# Patient Record
Sex: MALE | Race: WHITE | Employment: FULL TIME | URBAN - METROPOLITAN AREA
[De-identification: names, ages, dates, MRNs, and addresses within clinical notes are randomized per-mention and may not be internally consistent; named-entity substitution may affect disease eponyms.]

---
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
## 2020-06-17 ENCOUNTER — TELEPHONE (OUTPATIENT)
Dept: BEHAVIORAL HEALTH | Facility: CLINIC | Age: 20
End: 2020-06-17

## 2020-06-23 ENCOUNTER — HOSPITAL ENCOUNTER (OUTPATIENT)
Dept: BEHAVIORAL HEALTH | Facility: CLINIC | Age: 20
Discharge: HOME | End: 2020-06-23
Attending: FAMILY MEDICINE | Admitting: FAMILY MEDICINE
Payer: COMMERCIAL

## 2020-06-23 PROCEDURE — 90791 PSYCH DIAGNOSTIC EVALUATION: CPT | Mod: TEL

## 2020-06-23 NOTE — PROGRESS NOTES
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 D    What percentage of time do you spaulding alone?  With others?     PT reports gambling alone all of the time.    Are you currently in a significant relationship?     Yes.  PT reports he is currently engaged.    Sexual Orientation:     Heterosexual    Who do you live with?      PT reports living with his fikayla and her parents.    Do you have any children?      PT reports having a child with his ex-girlfriend, report the baby was put up for adoption, reporting an open adoption.  PT reports the child will be 1 in August.    How many times have you been ?    No    Describe your current support system i.e. family, friends, sponsor, therapist, etc.?    PT reports having support from his mother and fiance.  PT also reports support from current medical providers.    Do you have any past or present legal charges?    No    Do you have any obstacles that would prevent you from participating in treatment?    No    Do you pray, meditate, do yoga, attend AA/NA/GA or other spiritual practices?    Yes, please explain: pray     How does your spirituality impact your recovery?      Per PT \"It will help a lot\".    Please list any other problems, issues or concerns that could affect your recovery if not addressed?      Per PT \"None I " "can think of right now\".    Dimension VI Ratings   Recovery environment - The placing authority must use the criteria in Dimension VI to determine a client s recovery environment.   RISK DESCRIPTIONS - Severity ratin Client has passive social  or family and significant other are not interested in the client's recovery. The client is engaged in structured meaningful activity.    REASONS SEVERITY WAS ASSIGNED - (What support does the person have for making changes? What structure/stability does the person have in his or her daily life that will increase the likelihood that changes can be sustained? What problems exist in the person s environment that will jeopardize getting/staying clean and sober?)     The patient reports living with his fiance and her parents at the time of assessment.  Patient reports living environment is supportive of recovery efforts.  Patient reports he is currently engaged to be .  The patient does reports having relationship conflict with family due to continued gambling.  Patient appears to have limited external activities, leisure time, friends, or social groups outside of gambling.  The patient appears to lack adequate support in the community through 12-step meetings or other recovery based interactions at this time and appears to lack additional supports familiar with recovery.  Patient reports he is currently employed full time and appears to lack meaningful activities outside of employment.  Patient denies any current legal involvement at this time.         Collateral Contacts     Name:       Relationship:     Phone Number:     Releases:         No collateral contacts made.              Summary of Gambling Disorder Symptoms     Needs to spaulding with increasing amount of money in order to achieve desired excitement.  Is restlessness or irritable when attempting to cut down or stop gambling.  Is often preoccupied with gambling (e.g. having persistent thoughts of " "reliving past gambling experiences, handicapping or planning the next venture, thinking of ways to get money with which to spaulding).  After losing money gambling, individual often returned another day to get even. (\"chasing one's losses\")  Lies to conceal the extent of involvement with gambling.  Relies on others to provide money to relieve desperate financial situations caused by gambling (\"a bailout\")    Specify if:   Episodic:  Meeting diagnostic at more than one time point, with symptoms subsiding between periods of gambling disorder for at least several months.    Persistent:  Experiencing continuous symptoms, to meet diagnostic criteria for multiple years.    Specify if:   In early remission:  After full criteria for alcohol/drug use disorder were previously met, none of the criteria for alcohol/drug use disorder have been met for at least 3 months but for less than 12 months (with the exception that Criterion A4,  Craving or a strong desire or urge to use alcohol/drug  may be met).     In sustained remission:   After full criteria for alcohol use disorder were previously met, none of the criteria for alcohol/drug use disorder have been met at any time during a period of 12 months or longer (with the exception that Criterion A4,  Craving or strong desire or urge to use alcohol/drug  may be met).   Specify if:   This additional specifier is used if the individual is in an environment where access to alcohol is restricted.    Mild: Presence of 4-5 symptoms    Moderate: Presence of 6-7 symptoms    Severe: Presence of 8 or more symptoms      Summary of Substance Abuse Disorder Symptoms     A problematic pattern of alcohol/drug use leading to clinically significant impairment or distress, as manifested by at least two of the following, occurring within a 12-month period:    Alcohol/drug is often taken in larger amounts or over a longer period than was intended.  Withdrawal, as manifested by either of the following: " The characteristic withdrawal syndrome for alcohol/drug (refer to Criteria A and B of the criteria set for alcohol/drug withdrawal).    Specify if:   In early remission:  After full criteria for alcohol/drug use disorder were previously met, none of the criteria for alcohol/drug use disorder have been met for at least 3 months but for less than 12 months (with the exception that Criterion A4,  Craving or a strong desire or urge to use alcohol/drug  may be met).     In sustained remission:   After full criteria for alcohol use disorder were previously met, none of the criteria for alcohol/drug use disorder have been met at any time during a period of 12 months or longer (with the exception that Criterion A4,  Craving or strong desire or urge to use alcohol/drug  may be met).   Specify if:   This additional specifier is used if the individual is in an environment where access to alcohol is restricted.    Mild: Presence of 2-3 symptoms    Moderate: Presence of 4-5 symptoms    Severe: Presence of 6 or more symptoms    SOGS: 13 DSM-5: 7 CAGE-AID: 1 NEEL-7: 14 PHQ-9: 13     Mental Status Assessment    Physical Appearance/Attire:  Comment: Unable to assess due to telephone evaluation.  Hygiene:  Comment: Unable to assess due to telephone evaluation  Eye Contact:  comment: Unable to assess due to telephone evaluation.  Speech:  regular  Speech Volume:  regular  Speech Quality: fluid.  Cognitive/Perceptual:  reality based  Cognition:  memory intact   Judgment:  intact  Insight:  intact  Orientation:  time, place, person and situation  Thought:  logical   Hallucinations:  none  General Behavioral Tone:  cooperative  Psychomotor Activity:  Unable to assess due to telephone evaluation.  Gait:  Unable to assess due to telephone evaluation.  Mood:  normal  Affect:  congruence/appropriate      Vulnerable Adult Checklist for OUTPATIENTS     1.  Do you have a physical, emotional or mental infirmity or dysfunction?       No    2.   Does this issue impair your ability to provide for your own care without help, including providing yourself with food, shelter, clothing, healthcare or supervision?       No    3.  Because of this issue, I need assistance to protect myself from maltreatment by others.      No    Based on the above information:    This person is not a functional Vulnerable Adult according to [place] Statute 626.5572 subdivision 21.      Category Severity (ICD-10 Code / DSM 5 Code)   Gambling Disorder Severe  (F63.0) (312.31)   Alcohol Use Disorder Mild  (F10.10) (305.00)   Cannabis Use Disorder Mild  (F12.10) (305.20)   Hallucinogen Use Disorder NA   Inhalant Use Disorder NA   Opioid Use Disorder NA   Sedative, Hypnotic, or Anxiolytic Use Disorder NA   Stimulant Related Disorder NA   Tobacco Use Disorder NA   Other (or unknown) Substance Use Disorder NA     [place]-Suicide Severity Rating Scale   Suicide Ideation   1.) Have you ever wished you were dead or that you could go to sleep and not wake up?     Lifetime:  Yes Past Month:  Yes   2.) Have you actually had any thoughts of killing yourself?   Lifetime:  Yes Past Month:  No   3.) Have you been thinking about how you might do this?     Lifetime:  Yes, Describe: PT reports ideation with plan in Jr. Year of HS, reporting hospilzation followign. Past Month:  No   4.) Have you had these thoughts and had some intention of acting on them?     Lifetime:  No Past Month:  No   5.) Have you started to work out the details of how to kill yourself?   Lifetime:  No Past Month:  No   6.) Do you intend to carry out this plan?      Lifetime:  No Past Month:  No   Intensity of Ideation   Intensity of ideation (1 being least severe, 5 being most severe):     Lifetime:  4.5 Past Month:  1   How often do you have these thoughts?  Once a week      When you have the thoughts how long do they last?  Fleeting - few seconds or minutes   Can you stop thinking about killing yourself or wanting to die if  you want to?  Yes, easily able to control thoughts   Are there things - anyone or anything (i.e. family, Zoroastrian, pain of death) that stopped you from wanting to die or acting on thoughts of suicide?  Protective factors definitely stopped you from attempting suicide   What sort of reasons did you have for thinking about wanting to die or killing yourself (ie end pain, stop how you were feeling, get attention or reaction, revenge)?  Mostly to end or stop the pain (you couldn't go on living the way you were feeling)   Suicidal Behavior   (Suicide Attempt) - Have you made a suicide attempt?     Lifetime:  No Past Month:  No   Have you engaged in self-harm (non-suicidal self-injury)?  Yes - Jr. Year, PT reports knee pain, reporting watching Dr. Lawrence and illited pain elwear on body to elivate knee pain.   (Interrupted Attempt) - Has there been a time when you started to do something to end your life but someone or something stopped you before you actually did anything?  No   (Aborted or Self-Interrupted Attempt) - Has there been a time when you started to do something to try to end your life but you stopped yourself before you actually did anything?  No   (Preparatory Acts of Behavior) - Have you taken any steps towards making suicide attempt or preparing to kill yourself (such as collecting pills, getting a gun, giving valuables away or writing a suicide note)?  No   Actual Lethality/Medical Damage:  NA     2008  [employer]  Used with permission by Maritza Leary, PhD.       Guide to C-SSRS Risk Ratings   NO IDEATION:  with no active thoughts IDEATION: with a wish to die. IDEATION: with active thoughts. Risk Ratings   If Yes No No 0 - Very Low Risk   If NA Yes No 1 - Low Risk   If NA Yes Yes 2 - Low/moderate risk   IDEATION: associated thoughts of methods without intent or plan INTENT: Intent to follow through on suicide PLAN: Plan to follow through on suicide Risk Ratings cont...  "  If Yes No No 3 - Moderate Risk   If Yes Yes No 4 - High Risk   If Yes Yes Yes 5 - High Risk   The patient's ADDITIONAL RISK FACTORS and lack of PROTECTIVE FACTORS may increase their overall suicide risk ratings.     Additional Risk Factors:    Significant history of having untreated or poorly treated mental health symptoms     Significant history of untreated or poorly treated chronic pain issues     A triggering event(s) leading to humiliation, shame or despair     History of impulsive or aggressive behaviors   Protective Factors:    Having people in his/her life that would prevent the patient from considering a suicide attempt (i.e. young children, spouse, parents, etc.)     An absence of mental health issues or stable and well treated mental health issues     An absence of chronic health problems or stable and well treated chronic health issues     A positive relationship with his/her clinical medical and/or mental health providers     Having easy access to supportive family members     Having a good community support network     Having cultural, Amish or spiritual beliefs that discourage suicide     Having restricted access to highly lethal means of suicide     Risk Status   0. - Very Low Risk:  Evaluation Counselors:  Document in Epic / SBAR to counselor \"Very Low Risk\".      Treatment Counselors:  Reassess upon admission as applicable, assess weekly in progress notes under Dimension 3 and summarize in Discharge / Treatment summary under Dimension 3.   Additional information to support suicide risk rating: There was no additional information to provide at this time.     Summary of [facility] Placement Criteria:   I.) Intoxication and Withdrawal: 0   II.) Biomedical:  1   III.) Emotional and Behavioral:  2   IV.) Readiness to Change:  1   V.) Relapse Potential: 3   VI.) Recovery Environmental: 2     Evaluation Summary and Plan   's Recommendation    Assessors Recommendations:      Abstain from all forms " "of gambling, including \"free\" games and online/homer games.    Refrain from entering all types of madhavi establishments.    Self-ban with the help of a trusted other from all local casinos/card rooms, cut up players cards and have all gambling mail stopped.    Abstain from alcohol and all mood-altering substances unless prescribed by a licensed medical provider.    Gain a [facility] certified problem gambling treatment provider for individual counseling sessions.    Keep and attend all scheduled mental health appointments and follow recommendations.    Attend Gamblers Anonymous (GA) 12-step, cultural, spiritual, and/or other supportive community meetings on a weekly basis.    Check in weekly with a trusted individual whom will hold you accountable.    PT was given information regarding [facility] certified treatment providers and [employer] (GA) link, in addition to financial resources.     Initial problem list:    The patient lacks relapse prevention skills  The patient has poor coping skills  The patient has poor refusal skills   The patient lacks a sober peer support network  The patient has dual issues of MI and CD  The patient lacks the ability to effectively manage his/her mental health issues    Strengths:  Family support  Stable Employment  Financially secure  Properly treated mental health concerns  Properly treated physical health concerns  Stable housing  Intelligent  Functional communication skills        "

## 2020-06-26 NOTE — PROGRESS NOTES
"Good morning,    I m sending this Email regarding the recommendations from your gambling evaluation on 6/23/2020.  Based on the information you provided, you meet criteria for gambling disorder.    Recommendations:     1. Abstain from all forms of gambling, including \"free\" games , and online/homer games.  2. Refrain from entering all types of madhavi establishments.  3. Abstain from alcohol and all other mood-altering chemicals unless prescribed by a licensed provider.  4. Attend GA 12-step, cultural, spiritual, other supportive community meeting on a weekly basis.   5. Have someone you check in with weekly who will hold you accountable.   6. Remain law abiding.  7. Keep and attend all scheduled mental health appointments and follow recommendations.  8. Gain a certified gambling treatment provider for individual counseling sessions and follow ongoing recommendations.    Below are links to [facility] certified treatment providers whom provide individual counseling sessions.  In addition, I have included links for additional resources, including financial, and online Gamblers Anonymous meetings.    https://www.[facility].org/Problem-Gambling.asp     https://[place].gov/dhs/people-we-serve/adults/services/gambling-problems/programs-services/treatment-providers-gambling.jsp      Financial links:    https://www.[employer].Miner/    https://www.[employer]meRanberry.org/       Gamblers Anonymous (GA)    http://www.[employer].Miner/    [facility] [place] may currently be offering a one year subscription to [employer] to any [facility]. It is a service that blocks online gambling. Please contact Dulce Srinivasan at [facility]ambling [place] if you are interested. [email or web address]     Thank you.    Carolyn Mosley, [facility], [facility]  Problem Gambling Counselor  [address] [place], [place] " [address]  [phone]  [email or web address]

## 2020-07-06 ENCOUNTER — BEH TREATMENT PLAN (OUTPATIENT)
Dept: BEHAVIORAL HEALTH | Facility: CLINIC | Age: 20
End: 2020-07-06
Attending: PSYCHIATRY & NEUROLOGY

## 2020-07-06 ENCOUNTER — HOSPITAL ENCOUNTER (OUTPATIENT)
Dept: BEHAVIORAL HEALTH | Facility: CLINIC | Age: 20
Discharge: HOME | End: 2020-07-06
Attending: FAMILY MEDICINE | Admitting: FAMILY MEDICINE
Payer: COMMERCIAL

## 2020-07-06 DIAGNOSIS — F33.1 MDD (MAJOR DEPRESSIVE DISORDER), RECURRENT EPISODE, MODERATE (H): ICD-10-CM

## 2020-07-06 PROCEDURE — 90791 PSYCH DIAGNOSTIC EVALUATION: CPT | Mod: GT | Performed by: SOCIAL WORKER

## 2020-07-06 RX ORDER — LAMOTRIGINE 100 MG/1
100 TABLET ORAL DAILY
COMMUNITY

## 2020-07-06 ASSESSMENT — ANXIETY QUESTIONNAIRES
7. FEELING AFRAID AS IF SOMETHING AWFUL MIGHT HAPPEN: NOT AT ALL
3. WORRYING TOO MUCH ABOUT DIFFERENT THINGS: SEVERAL DAYS
6. BECOMING EASILY ANNOYED OR IRRITABLE: MORE THAN HALF THE DAYS
IF YOU CHECKED OFF ANY PROBLEMS ON THIS QUESTIONNAIRE, HOW DIFFICULT HAVE THESE PROBLEMS MADE IT FOR YOU TO DO YOUR WORK, TAKE CARE OF THINGS AT HOME, OR GET ALONG WITH OTHER PEOPLE: SOMEWHAT DIFFICULT
5. BEING SO RESTLESS THAT IT IS HARD TO SIT STILL: MORE THAN HALF THE DAYS
1. FEELING NERVOUS, ANXIOUS, OR ON EDGE: MORE THAN HALF THE DAYS
2. NOT BEING ABLE TO STOP OR CONTROL WORRYING: SEVERAL DAYS
GAD7 TOTAL SCORE: 10

## 2020-07-06 ASSESSMENT — PATIENT HEALTH QUESTIONNAIRE - PHQ9
SUM OF ALL RESPONSES TO PHQ QUESTIONS 1-9: 15
5. POOR APPETITE OR OVEREATING: MORE THAN HALF THE DAYS
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
## 2020-07-07 ENCOUNTER — TELEPHONE (OUTPATIENT)
Dept: BEHAVIORAL HEALTH | Facility: CLINIC | Age: 20
End: 2020-07-07

## 2020-07-07 ENCOUNTER — HOSPITAL ENCOUNTER (OUTPATIENT)
Dept: BEHAVIORAL HEALTH | Facility: CLINIC | Age: 20
End: 2020-07-07
Attending: PSYCHIATRY & NEUROLOGY
Payer: COMMERCIAL

## 2020-07-07 PROBLEM — F33.1 MDD (MAJOR DEPRESSIVE DISORDER), RECURRENT EPISODE, MODERATE (H): Status: ACTIVE | Noted: 2020-07-07

## 2020-07-07 PROCEDURE — 90853 GROUP PSYCHOTHERAPY: CPT | Mod: GT,95 | Performed by: COUNSELOR

## 2020-07-07 ASSESSMENT — ANXIETY QUESTIONNAIRES
7. FEELING AFRAID AS IF SOMETHING AWFUL MIGHT HAPPEN: SEVERAL DAYS
2. NOT BEING ABLE TO STOP OR CONTROL WORRYING: MORE THAN HALF THE DAYS
3. WORRYING TOO MUCH ABOUT DIFFERENT THINGS: SEVERAL DAYS
1. FEELING NERVOUS, ANXIOUS, OR ON EDGE: MORE THAN HALF THE DAYS
GAD7 TOTAL SCORE: 13
IF YOU CHECKED OFF ANY PROBLEMS ON THIS QUESTIONNAIRE, HOW DIFFICULT HAVE THESE PROBLEMS MADE IT FOR YOU TO DO YOUR WORK, TAKE CARE OF THINGS AT HOME, OR GET ALONG WITH OTHER PEOPLE: SOMEWHAT DIFFICULT
GAD7 TOTAL SCORE: 10
6. BECOMING EASILY ANNOYED OR IRRITABLE: MORE THAN HALF THE DAYS
5. BEING SO RESTLESS THAT IT IS HARD TO SIT STILL: NEARLY EVERY DAY

## 2020-07-07 ASSESSMENT — PATIENT HEALTH QUESTIONNAIRE - PHQ9
SUM OF ALL RESPONSES TO PHQ QUESTIONS 1-9: 16
5. POOR APPETITE OR OVEREATING: MORE THAN HALF THE DAYS

## 2020-07-07 NOTE — PROGRESS NOTES
"RN Review of Medical History / Physical Health Screen  Outpatient Mental Health Programs - Adult    Adult Dual Diagnosis Day Treatment    PATIENT'S NAME: Derik Rashid  MRN:   [id number]  :   2000  ACCT. NUMBER: [id number]  CURRENT AGE:  19 year old    DATE OF DIAGNOSTIC ASSESSMENT: 20  DATE OF ADMISSION: 20     Please see Diagnostic Assessment for additional Medical History.     General Health:   Have you had any exposure to any communicable disease in the past 2-3 weeks? no     Are you aware of safe sex practices? yes       Nutrition:    Are you on a special diet? If yes, please explain:  no   Do you have any concerns regarding your nutritional status? If yes, please explain:  no   Have you had any appetite changes in the last 3 months?  No     Have you had any weight loss or weight gain in the last 3 months?  No     Do you have a history of an eating disorder? no    Do you have a history of being in an eating disorder program? no     Patient height and weight recorded by RN in epic flowsheet: noNo; Unable to measure  Because of temporary in-person programmatic suspension due to COVID-19 pandemic, all pt weights and heights will be collected through patient self-report an recorded in physical health screening progress note upon admission to the program.                            Height/Weight Review:  Patient reported height:     6'13/4\"   Patient reports weight:  Date last checked:  170lb   Any referrals/needs identified?     no         BMI Review:  Was the patient informed of BMI? no      Findings  No Intervention         Fall Risk:   Have you had any falls in the past 3 months? no     Do you currently use any assistive devices for mobility?   no      Additional Comments/Assessment: no    Per completion of the Medical History / Physical Health Screen, is there a recommendation to see / follow up with a primary care physician/clinic or dentist?    No. pts sees PMD for concerns.      Jamilah " JAN Cristobal  7/7/2020

## 2020-07-07 NOTE — PROGRESS NOTES
Admission Date: 7/7/2020    Identify any current concerns with potential impact to admission:     medication/medical concerns: None reported     immediate safety concerns: None reported    Does patient have safety plan? No  Note: Please copy safety plan copied into BEH Encounter     Other (insurance/childcare/transportation/housing/planned absences/etc): None reported    Patient's insurance is: [employer] . Does patient need appointment with provider? No    If patient has Medical Assistance (MA) is LOCUS and Functional Assessment completed? N/A    If patient is in Partial Hospitalization Program is LOCUS completed? N/A                                                                                      (delete if not applicable):   For Dual Disorder Outpatient Program:     Patient reported his last use of substances as: 6/29/20 (alcohol and vyvanse)     Patient reports the following concerns in regards to withdrawal/intoxication: None reported        Completed by: Leonor Marroquin [facility]

## 2020-07-07 NOTE — GROUP NOTE
Psychotherapy Group Note    PATIENT'S NAME: Derik Rashid  MRN:   [id number]  :   2000  ACCT. NUMBER: [id number]  DATE OF SERVICE: 20  START TIME: 10:00 AM  END TIME: 10:50 AM  FACILITATOR: Leonor Marroquin [facility]  TOPIC: MH EBP Group: Self-Awareness  Adult Dual Diagnosis Day Treatment  TRACK: 1    NUMBER OF PARTICIPANTS: 7    Telemedicine Visit: The patient's condition can be safely assessed and treated via synchronous audio and visual telemedicine encounter.      Reason for Telemedicine Visit: Services only offered telehealth    Originating Site (Patient Location): Patient's home    Distant Site (Provider Location): Provider Remote Setting    Consent:  The patient/guardian has verbally consented to: the potential risks and benefits of telemedicine (video visit) versus in person care; bill my insurance or make self-payment for services provided; and responsibility for payment of non-covered services.     Mode of Communication:  Video Conference via [employer]    As the provider I attest to compliance with applicable laws and regulations related to telemedicine.      Summary of Group / Topics Discussed:  Self-Awareness: Self-Compassion: Patients received overview of key concepts in developing self-compassion. Patients discussed mindfulness, self-kindness, and finding common humanity. Patients identified their current approach to problems in their lives and learned skills for increasing self-compassion. Patients identified ways they can put self-compassion skills into practice and problem solve barriers to application of skills.     Patient Session Goals / Objectives:    [place] components of self-compassion    Identify ways to practice self-compassion in daily life    Problem solve barriers to self-compassion practice      Patient Participation / Response:  Fully participated with the group by sharing personal reflections / insights and openly received / provided feedback with other  participants.    Demonstrated understanding of topics discussed through group discussion and participation, Demonstrated understanding of values, strengths, and challenges to learn about themselves, Identified / Expressed readiness to act intentionally, increase self-compassion, promote personal growth and Verbalized understanding of ways to proactively manage illness    Treatment Plan:  Patient has an initial individualized treatment plan that was created as part of their diagnostic assessment / admission process.  A master individualized treatment plan is in the process of being developed with the patient and multi-disciplinary care team.    Leonor Marroquin, [facility]

## 2020-07-07 NOTE — GROUP NOTE
"Process Group Note    PATIENT'S NAME: Derik Rashid  MRN:   [id number]  :   2000  ACCT. NUMBER: [id number]  DATE OF SERVICE: 20  START TIME:  9:00 AM  END TIME:  9:50 AM  FACILITATOR: Leonor Marroquin [facility]  TOPIC:  Process Group    Diagnoses:  296.32 (F33.1) Major Depressive Disorder, Recurrent Episode, Moderate _ and With anxious distress  Substance-Related & Addictive Disorders Alcohol Use Disorder   303.90 (F10.20) Moderate In early remission,   304.30 (F12.20) Cannabis Use Disorder Moderate  In early remission, .  4. Other Diagnoses that is relevant to services:   Autism Spectrum Disorder 299.00(F84.0)  Associated with another neurodevelopmental, mental or behavioral disorder.      Adult Dual Diagnosis Day Treatment  TRACK: 1    NUMBER OF PARTICIPANTS: 6    Telemedicine Visit: The patient's condition can be safely assessed and treated via synchronous audio and visual telemedicine encounter.      Reason for Telemedicine Visit: Services only offered telehealth    Originating Site (Patient Location): Patient's home    Distant Site (Provider Location): Provider Remote Setting    Consent:  The patient/guardian has verbally consented to: the potential risks and benefits of telemedicine (video visit) versus in person care; bill my insurance or make self-payment for services provided; and responsibility for payment of non-covered services.     Mode of Communication:  Video Conference via [employer]    As the provider I attest to compliance with applicable laws and regulations related to telemedicine.            Data:    Session content: At the start of this group, patients were invited to check in by identifying themselves, describing their current emotional status, and identifying issues to address in this group.   Area(s) of treatment focus addressed in this session included Symptom Management, Personal Safety and Abstinence/Relapse Prevention.    Bertram reported feeling \"pretty good\" today.  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  He took some time to introduce himself to the group and share what brought him to treatment.  He reported that his substance use was causing lot of problems in his personal life (with fiance and parents) and knew he needed to do something about it.  He reported his is currently living at home with his parents but him and his fiance plan to move to [place] in the future.     Therapeutic Interventions/Treatment Strategies:  Psychotherapist offered support, feedback and validation and reinforced use of skills. Treatment modalities used include Motivational Interviewing, Cognitive Behavioral Therapy and Dialectical Behavioral Therapy. Interventions include Relapse Prevention: Discussed the use of substances and its impact on their relationships.    Assessment:    Patient response:   Patient responded to session by accepting feedback, giving feedback and listening    Possible barriers to participation / learning include: and no barriers identified    Health Issues:   None reported       Substance Use Review:   Substance Use: alcohol .  and Last use: 6/28/20    Mental Status/Behavioral Observations  Appearance:   Appropriate   Eye Contact:   Good   Psychomotor Behavior: Normal   Attitude:   Cooperative   Orientation:   All  Speech   Rate / Production: Normal    Volume:  Normal   Mood:    Normal  Affect:    Appropriate   Thought Content:   Clear  Thought Form:  Coherent  Tangential     Insight:    Fair     Plan:     Safety Plan: No current safety concerns identified.  Recommended that patient call 911 or go to the local ED should there be a change in any of these risk factors.     Barriers to treatment: None identified    Patient Contracts (see media tab):  None    Substance Use: Provided encouragement towards sobriety    Provided support and affirmation for steps taken towards sobriety      Continue or Discharge: Patient will continue in Adult Dual Disorder  Program (DDP) as planned. Patient is likely to benefit from learning and using skills as they work toward the goals identified in their treatment plan.      Leonor Marroquin, [facility]  July 7, 2020

## 2020-07-08 ENCOUNTER — HOSPITAL ENCOUNTER (OUTPATIENT)
Dept: BEHAVIORAL HEALTH | Facility: CLINIC | Age: 20
Discharge: HOME | End: 2020-07-08
Attending: PSYCHIATRY & NEUROLOGY | Admitting: PSYCHIATRY & NEUROLOGY
Payer: COMMERCIAL

## 2020-07-08 ENCOUNTER — HOSPITAL ENCOUNTER (OUTPATIENT)
Dept: BEHAVIORAL HEALTH | Facility: CLINIC | Age: 20
End: 2020-07-08
Attending: PSYCHIATRY & NEUROLOGY
Payer: COMMERCIAL

## 2020-07-08 PROCEDURE — 90832 PSYTX W PT 30 MINUTES: CPT | Mod: GT | Performed by: PSYCHOLOGIST

## 2020-07-08 PROCEDURE — 90853 GROUP PSYCHOTHERAPY: CPT | Mod: GT,95 | Performed by: COUNSELOR

## 2020-07-08 PROCEDURE — G0177 OPPS/PHP; TRAIN & EDUC SERV: HCPCS | Mod: GT

## 2020-07-08 ASSESSMENT — ANXIETY QUESTIONNAIRES: GAD7 TOTAL SCORE: 13

## 2020-07-08 NOTE — ADDENDUM NOTE
Encounter addended by: Jamilah Cristobal RN on: 7/8/2020 3:12 PM   Actions taken: Clinical Note Signed

## 2020-07-08 NOTE — GROUP NOTE
Psychotherapy Group Note    PATIENT'S NAME: Derik Rashid  MRN:   [id number]  :   2000  ACCT. NUMBER: [id number]  DATE OF SERVICE: 20  START TIME: 11:00 AM  END TIME: 11:50 AM  FACILITATOR: Leonor Marroquin [facility]  TOPIC:  EBP Group: DDP Relapse Prevention  Adult Dual Diagnosis Day Treatment  TRACK: 1    NUMBER OF PARTICIPANTS: 6    Telemedicine Visit: The patient's condition can be safely assessed and treated via synchronous audio and visual telemedicine encounter.      Reason for Telemedicine Visit: Services only offered telehealth    Originating Site (Patient Location): Patient's home    Distant Site (Provider Location): Provider Remote Setting    Consent:  The patient/guardian has verbally consented to: the potential risks and benefits of telemedicine (video visit) versus in person care; bill my insurance or make self-payment for services provided; and responsibility for payment of non-covered services.     Mode of Communication:  Video Conference via [employer]    As the provider I attest to compliance with applicable laws and regulations related to telemedicine.      Summary of Group / Topics Discussed:  DDP Relapse Prevention: Goodbye Letter: Patients were assigned topic of writing a goodbye letter to their substance of abuse and presented the goodbye letter to the group. Purpose of this assignment is to process grief around loss of substance and coping with emotions of not having substances as a way to cope with stressors and difficult emotions. This assignment highlights maladaptive relationship patient has with the substance and gain awareness about how substance use impacted functioning. This group is conducted in a two part series. First group is education about assignment and reading example goodbye letters. Second group allows patients to read their completed goodbye letters to group members and receive feedback and validation.     Patient Session Goals / Objectives:    Processed  grief and loss of the substances    Gained awareness of maladaptive relationship with substance     Received support, feedback, and validation from peers        Patient Participation / Response:  Fully participated with the group by sharing personal reflections / insights and openly received / provided feedback with other participants.    Demonstrated understanding of topics discussed through group discussion and participation, Demonstrated understanding of utilizing relapse prevention skills to manage urges and maintain sobriety, Identified / Expressed personal readiness to utilize relapse prevention skills and Verbalized understanding of how relapse prevention skills can assist in maintaining sobriety    Treatment Plan:  Patient has an initial individualized treatment plan that was created as part of their diagnostic assessment / admission process.  A master individualized treatment plan is in the process of being developed with the patient and multi-disciplinary care team.    Leonor Marroquin, [facility]

## 2020-07-08 NOTE — PROGRESS NOTES
"Adult Dual Diagnosis Day Treatment  TRACK: 1     PATIENT'S NAME: Derik  \"Bertram\" Gay  MRN:   [id number]  :   2000  ACCT. NUMBER: [id number]  DATE OF SERVICE: 20   START TIME: 14:00  END TIME: 14:33    Telemedicine Visit: The patient's condition can be safely assessed and treated via synchronous audio and visual telemedicine encounter.      Reason for Telemedicine Visit: Patient unable to travel due to COVID 19    Originating Site (Patient Location): Patient's home    Distant Site (Provider Location): Provider Remote Setting    Consent:  The patient/guardian has verbally consented to: the potential risks and benefits of telemedicine (video visit) versus in person care; bill my insurance or make self-payment for services provided; and responsibility for payment of non-covered services.     Mode of Communication:  Video Conference via [employer]    As the provider I attest to compliance with applicable laws and regulations related to telemedicine.    ATTENDEES: Patient, Lucinda (guardian) and this author    Previous PHQ-9:   PHQ-9 SCORE 2020   PHQ-9 Total Score 15 16     Previous NEEL-7:   NEEL-7 SCORE 2020   Total Score 10 13     DATA    Treatment Objective(s) Addressed in This Session:  The purpose of today's call is for this author to provide oversight of patient's care while receiving program services. Specific treatment goals addressed included personal safety, symptoms stabilization and management, wellness and mental health, and community resources/discharge planning.     Patient identified the following initial areas for treatment focus: gambling addiction since the age of 16-years, use of substances, overdose on medication while manic/hypomanic, self-referred for program    Current Stressors / Issues:  During today's visit, this author identified herself, the purpose of the visit and her role of provider oversight while patient is participating in the program. In " "addition, this author assessed the patient's mental health diagnoses and symptoms. The patient reports they currently manage mental health symptoms by taking time off of work, evaluate future, swimming, exploring an online Youtube channel, and taking medications.  Patient reports relief from their presenting issue because of attending group.     Patient reports  effectiveness of current medications managed by: Ayaz Lopez DNP, RN, CNP ([facility]). Patient denies that their medications have changed.      Lisdexamfetamine Dimesylate (VYVANSE PO)  Not Taking    BuPROPion HCl (WELLBUTRIN PO)  Not Taking    lamoTRIgine (LAMICTAL) 100 MG tablet  Taking      Medication Adherence:  Patient reports taking prescribed medications as prescribed.    Patient denies that they work with an individual therapist. They were offered referrals and declined.     Patient identified that psychodiagnostic testing, increased motivation, and staying busy would be beneficial for their care moving forward.     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  Patient denies current or recent suicidal ideation or behaviors.  Patient denies current or recent homicidal ideation or behaviors.  Patient denies current or recent self injurious behavior or ideation.  Patient denies other safety concerns.  A " safety and risk management plan has not been developed at this time, however patient was encouraged to call [place] [phone] should there be a change in any of these risk factors.     Appearance:   Appropriate   Eye Contact:   Unable to assess via video   Psychomotor Behavior: Normal   Attitude:   Cooperative   Orientation:   All  Speech   Rate / Production: Normal/ Responsive   Volume:  Normal   Mood:    Normal  Affect:    Appropriate   Thought Content:  Clear   Thought Form:  Coherent  Logical   Insight:    Fair    Diagnoses:    296.32 (F33.1) Major Depressive Disorder, Recurrent Episode, Moderate and With anxious distress  Substance-Related & Addictive Disorders Alcohol Use Disorder   303.90 (F10.20) Moderate In early remission,   304.30 (F12.20) Cannabis Use Disorder Moderate  In early remission  Autism Spectrum Disorder 299.00(F84.0)      Patient's mother reported a diagnosis of Bipolar Disorder, but the patient and mother want patient to go through psych testing to confirm - This author agreed to send testing referrals via [employer] after today's visit.     Plan/Recommendations: (Homework, other):  This author will follow up with the patient in approximately 30 days.    Patient continues to meet criteria for recommended level of care: Yes - Dual Diagnosis Program 5x/week for 3 hours per day    Patient would be at reasonable risk of requiring a higher level of care in the absence of current services.    Patient does agree with the current plan of care.    Tanesha Fuentes PsyD, DEBRA  7/8/2020

## 2020-07-08 NOTE — GROUP NOTE
Psychoeducation Group Note    PATIENT'S NAME: Derik Rashid  MRN:   [id number]  :   2000  ACCT. NUMBER: [id number]  DATE OF SERVICE: 20  START TIME: 10:00 AM  END TIME: 10:50 AM  FACILITATOR: Jamilah Cristobal RN  TOPIC:  RN Group: Mental Health Maintenance  Adult Dual Diagnosis Day Treatment  TRACK: 1    NUMBER OF PARTICIPANTS: 6    Summary of Group / Topics Discussed:  Mental Health Maintenance:  Self compassion: In this group, the concept of self compassion was explored through the viewing, discussion, and self-reflection of the Prisca Laurent Titus Talk Titled,  Self -Compassion.      Patient Session Goals / Objectives:  ? Defined and described definition of self compassion  ? Identified 2 or more ways of practicing self compassion    Telemedicine Visit: The patient's condition can be safely assessed and treated via synchronous audio and visual telemedicine encounter.      Reason for Telemedicine Visit: Services only offered telehealth    Originating Site (Patient Location): Patient's home    Distant Site (Provider Location): Provider Remote Setting    Consent:  The patient/guardian has verbally consented to: the potential risks and benefits of telemedicine (video visit) versus in person care; bill my insurance or make self-payment for services provided; and responsibility for payment of non-covered services.     Mode of Communication:  Video Conference via [employer]    As the provider I attest to compliance with applicable laws and regulations related to telemedicine.    Patient Participation / Response:  Moderately participated, sharing some personal reflections / insights and adequately adequately received / provided feedback with other participants.    Demonstrated understanding of topics discussed through group discussion and participation    Treatment Plan:  Patient has a current master individualized treatment plan.  See Epic treatment plan for more information.    Jamilah Cristobal RN

## 2020-07-08 NOTE — GROUP NOTE
"Process Group Note    PATIENT'S NAME: Derik Rashid  MRN:   [id number]  :   2000  ACCT. NUMBER: [id number]  DATE OF SERVICE: 20  START TIME:  9:00 AM  END TIME:  9:50 AM  FACILITATOR: Leonor Marroquin [facility]  TOPIC:  Process Group    Diagnoses:  296.32 (F33.1) Major Depressive Disorder, Recurrent Episode, Moderate _ and With anxious distress  Substance-Related & Addictive Disorders Alcohol Use Disorder   303.90 (F10.20) Moderate In early remission,   304.30 (F12.20) Cannabis Use Disorder Moderate  In early remission, .  4. Other Diagnoses that is relevant to services:   Autism Spectrum Disorder 299.00(F84.0)  Associated with another neurodevelopmental, mental or behavioral disorder.      Adult Dual Diagnosis Day Treatment  TRACK: 1    NUMBER OF PARTICIPANTS: 7    Telemedicine Visit: The patient's condition can be safely assessed and treated via synchronous audio and visual telemedicine encounter.      Reason for Telemedicine Visit: Services only offered telehealth    Originating Site (Patient Location): Patient's home    Distant Site (Provider Location): Provider Remote Setting    Consent:  The patient/guardian has verbally consented to: the potential risks and benefits of telemedicine (video visit) versus in person care; bill my insurance or make self-payment for services provided; and responsibility for payment of non-covered services.     Mode of Communication:  Video Conference via [employer]    As the provider I attest to compliance with applicable laws and regulations related to telemedicine.            Data:    Session content: At the start of this group, patients were invited to check in by identifying themselves, describing their current emotional status, and identifying issues to address in this group.   Area(s) of treatment focus addressed in this session included Symptom Management, Personal Safety and Abstinence/Relapse Prevention.    Bertram reported feeling \"very tired\" today.  His " goal today is to make some phone calls and attend an appointment.  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  She reported that he has been struggling with feeling unmotivated and discussed with the group strategies to increase productivity when feeling unmotivated.     Therapeutic Interventions/Treatment Strategies:  Psychotherapist offered support, feedback and validation and reinforced use of skills. Treatment modalities used include Motivational Interviewing, Cognitive Behavioral Therapy and Dialectical Behavioral Therapy. Interventions include Behavioral Activation: Encouraged strategies to reduce individual procrastination and increase motivation by increasing goal-directed activities to enhance mood and reduce symptoms..    Assessment:    Patient response:   Patient responded to session by accepting feedback, giving feedback and listening    Possible barriers to participation / learning include: and no barriers identified    Health Issues:   None reported       Substance Use Review:   Substance Use: Substance use has decreased    Mental Status/Behavioral Observations  Appearance:   Appropriate   Eye Contact:   Good   Psychomotor Behavior: Normal   Attitude:   Cooperative   Orientation:   All  Speech   Rate / Production: Normal    Volume:  Normal   Mood:    Depressed   Affect:    Appropriate   Thought Content:   Clear  Thought Form:  Coherent  Logical     Insight:    Fair     Plan:     Safety Plan: No current safety concerns identified.  Recommended that patient call 911 or go to the local ED should there be a change in any of these risk factors.     Barriers to treatment: None identified    Patient Contracts (see media tab):  None    Substance Use: Provided encouragement towards sobriety    Provided support and affirmation for steps taken towards sobriety      Continue or Discharge: Patient will continue in Adult Dual Disorder Program (DDP) as planned. Patient is likely to benefit from learning and  using skills as they work toward the goals identified in their treatment plan.      Leonor Marroquin, [facility]  July 8, 2020

## 2020-07-09 ENCOUNTER — HOSPITAL ENCOUNTER (OUTPATIENT)
Dept: BEHAVIORAL HEALTH | Facility: CLINIC | Age: 20
End: 2020-07-09
Attending: PSYCHIATRY & NEUROLOGY
Payer: COMMERCIAL

## 2020-07-09 PROCEDURE — G0177 OPPS/PHP; TRAIN & EDUC SERV: HCPCS | Mod: GT

## 2020-07-09 PROCEDURE — G0177 OPPS/PHP; TRAIN & EDUC SERV: HCPCS | Mod: GT | Performed by: OCCUPATIONAL THERAPIST

## 2020-07-09 NOTE — GROUP NOTE
Psychoeducation Group Note    PATIENT'S NAME: Derik Rashid  MRN:   [id number]  :   2000  ACCT. NUMBER: [id number]  DATE OF SERVICE: 20  START TIME:  9:00 AM  END TIME:  9:50 AM  FACILITATOR: Drea Olsen OTR/L  TOPIC: SUSANNAH RN Group: Health Maintenance - Life Skills Topic, also  Adult Dual Diagnosis Day Treatment  TRACK: 1    NUMBER OF PARTICIPANTS: 5  Telemedicine Visit: The patient's condition can be safely assessed and treated via synchronous audio and visual telemedicine encounter.      Reason for Telemedicine Visit: Services only offered telehealth    Originating Site (Patient Location): Patient's home    Distant Site (Provider Location): [facility]: [facility]    Consent:  The patient/guardian has verbally consented to: the potential risks and benefits of telemedicine (video visit) versus in person care; bill my insurance or make self-payment for services provided; and responsibility for payment of non-covered services.     Mode of Communication:  Video Conference via [email or web address]    As the provider I attest to compliance with applicable laws and regulations related to telemedicine.      Summary of Group / Topics Discussed:  Health Maintenance: Weekend planning: Patients were given time to complete a weekend plan of what they will do to promote wellness and sobriety over the weekend when they do not have the structure of group. Patients were encouraged to review progress on their treatment goals and were challenged to identify ways to work toward meeting them. Patients identified and discussed foreseeable barriers to success over the weekend and then developed a plan to overcome them. Patients reviewed their distress coping skills and social support network and discussed this with the group.       Patient Session Goals / Objectives:    ?    Identified activities to engage in that promote balance in wellness  ?    Distinguished possible barriers to success over the  weekend and created a plan to overcome them  ?    Listed distress coping skills and identified social support network to utilize if in crisis during the weekend          Patient Participation / Response:  Fully participated with the group by sharing personal reflections / insights and openly received / provided feedback with other participants.    Demonstrated understanding of topics discussed through group discussion and participation, Identified / Expressed personal readiness to practice skills, Verbalized understanding of health maintenance topic and Further teaching needed, such as setting timelines for follow through    Treatment Plan:  Patient has an initial individualized treatment plan that was created as part of their diagnostic assessment / admission process.  A master individualized treatment plan is in the process of being developed with the patient and multi-disciplinary care team.    Drea Olsen, NOEMYR/L

## 2020-07-09 NOTE — GROUP NOTE
Psychoeducation Group Note    PATIENT'S NAME: Derik Rashid  MRN:   [id number]  [facility]   2000  [id number]  DATE OF SERVICE: 20  START TIME: 10:00 AM  END TIME: 10:50 AM  FACILITATOR: Jamilah Cristobal RN  TOPIC: SUSANNAH RN Group: Mental Health Maintenance  Adult Dual Diagnosis Day Treatment  TRACK: 1    NUMBER OF PARTICIPANTS: 6    Summary of Group / Topics Discussed:  Mental Health Maintenance:  Stigma: In this group patients explored stigma surrounding a mental health diagnosis.  The group discussed the way stigma impacts their own life, and discussed strategies to reduce. The relationship between physical and mental health were also explored in the context of healthcare access, treatment, and support.    Patient Session Goals / Objectives:  ? Patients identified the importance of practicing emotional hygiene  ? Patients identified ways to decrease the  impact of stigma in their own life    Telemedicine Visit: The patient's condition can be safely assessed and treated via synchronous audio and visual telemedicine encounter.      Reason for Telemedicine Visit: Services only offered telehealth    Originating Site (Patient Location): Patient's home    Distant Site (Provider Location): Provider Remote Setting    Consent:  The patient/guardian has verbally consented to: the potential risks and benefits of telemedicine (video visit) versus in person care; bill my insurance or make self-payment for services provided; and responsibility for payment of non-covered services.     Mode of Communication:  Video Conference via [employer]    As the provider I attest to compliance with applicable laws and regulations related to telemedicine.       Patient Participation / Response:  Fully participated with the group by sharing personal reflections / insights and openly received / provided feedback with other participants.    Identified / Expressed personal readiness to practice skills    Treatment  Plan:  Patient has a current master individualized treatment plan.  See Epic treatment plan for more information.    Jamilah Cristobal RN

## 2020-07-13 ENCOUNTER — TELEPHONE (OUTPATIENT)
Dept: BEHAVIORAL HEALTH | Facility: CLINIC | Age: 20
End: 2020-07-13

## 2020-07-13 NOTE — DISCHARGE SUMMARY
Adult Dual Disorder Program   Discharge Summary/Instructions     Patient: Derik Rashid MRN: [id number]  : 2000 Age:  19 year old Sex:  male    Admission Date: 20  Discharge Date: 20  Diagnosis:   296.32 (F33.1) Major Depressive Disorder, Recurrent Episode, Moderate _ and With anxious distress  Substance-Related & Addictive Disorders Alcohol Use Disorder   303.90 (F10.20) Moderate In early remission,   304.30 (F12.20) Cannabis Use Disorder Moderate  In early remission, .  4. Other Diagnoses that is relevant to services:   Autism Spectrum Disorder 299.00(F84.0)  Associated with another neurodevelopmental, mental or behavioral disorder.      Focus of Treatment / Discharge Recommendations: You have chosen to self-discharge from the Dual Disorder [facility] due to moving to [place].  Recommendations are to establish care with a therapist and psychiatrist in [place], take your medications as prescribed, avoid drugs and alcohol, and attend community support meetings.     Personal Safety/ Management of Symptoms:    * Follow your safety plan.  Report increased symptoms to your care team and/or use the crisis resources listed below.    Crisis Resources:    Suicide Prevention Lifeline: 2-589-632-TALK (9744)    Crisis Text Line Service (available 24 hours a day, 7 days a week): Text [place] to 132876    Call  **CRISIS (134229) from a cell phone to talk to a team of professionals who can help you.  Crisis Services By [facility]: Phone Number:   [place]     [phone]   [place]    [phone]   [place]    [phone]   [place]    [phone]   [place]    [phone]   [place] [phone]   [place]     [phone]       Call 911 or go to my nearest emergency department.     Managing Symptoms / Abstinence / Preventing Relapse:    Take all medicines as directed.  Carry a current list of medicines with you.    Use coping skills: mindfulness, distraction, exercise, etc.    Do not use illicit (street) drugs,  controlled substances (narcotics) or alcohol.    Go to all appointments.    Report symptoms to your care team including: thoughts of suicide, loss of sleep, increased confusion, mood getting worse, feeling more aggressive, or substance use.    Develop/Improve Independent Living/Socialization Skills: Maintain healthy boundaries, work on maintaining/improve communication, and reaching out for support.     Community Resources/Supports and Discharge Planning:      Follow up with psychiatrist / main caregiver: Dr. Jacome    Next visit: Unknown    Follow up with your therapist: N/A   Next visit: N/A    Go to group therapy and / or support groups at: [facility], [facility], [facility], etc.    See your medical doctor about:  Any physical health concerns    Other:  N/A    Copy of summary sent to: N/A      Client Signature:____unable to sign due to COVID-19_____________________________   Date / Time:___________    Staff Signature:__sign  .________________________________   Date / Time:___________

## 2020-07-13 NOTE — TELEPHONE ENCOUNTER
Writer called Bertram as he did not attend group today.  Bertram reported that he is moving to [place] very soon and would like to self-discharge.  Writer encouraged him to look up resources and providers in the area he is moving to ASAP.    Leonor Marroquin, [facility] on 7/13/2020 at 11:21 AM

## 2020-07-13 NOTE — TELEPHONE ENCOUNTER
----- Message from Leonor Marroquin [facility] sent at 7/13/2020 11:22 AM CDT -----  Regarding: discharge  Derik self-discharged effective today

## 2020-07-13 NOTE — DISCHARGE SUMMARY
Adult Dual Disorder Program Discharge Summary / Instructions     Patient: Derik Rashid MRN: [id number]  : 2000 Age:  19 year old Sex:  male    Admission Date: 20  Discharge Date: 20    Reason for Discharge: Patient/Client decision        Prognosis: Poor      Client Progress Toward AchievingTreatment Plan Goals / Dimensions Risk Scale       Derik attended 8 of 15 scheduled MICD groups. Absences were due to unknown.      Diagnosis:   296.32 (F33.1) Major Depressive Disorder, Recurrent Episode, Moderate _ and With anxious distress  Substance-Related & Addictive Disorders Alcohol Use Disorder   303.90 (F10.20) Moderate In early remission,   304.30 (F12.20) Cannabis Use Disorder Moderate  In early remission, .  4. Other Diagnoses that is relevant to services:   Autism Spectrum Disorder 299.00(F84.0)  Associated with another neurodevelopmental, mental or behavioral disorder.      Individualized Treatment Plan Goals/Progress:   Treatment plan not created yet     Admit Discharge   PHQ-9 16 N/A   NEEL-7 13 N//a   CGI 5/5 5/4         Additional Comments: N/A    Completed By: Leonor Marroquin, [facility], LADC

## 2020-12-14 ENCOUNTER — HEALTH MAINTENANCE LETTER (OUTPATIENT)
Age: 20
End: 2020-12-14

## 2021-10-02 ENCOUNTER — HEALTH MAINTENANCE LETTER (OUTPATIENT)
Age: 21
End: 2021-10-02

## 2022-01-22 ENCOUNTER — HEALTH MAINTENANCE LETTER (OUTPATIENT)
Age: 22
End: 2022-01-22

## 2022-09-03 ENCOUNTER — HEALTH MAINTENANCE LETTER (OUTPATIENT)
Age: 22
End: 2022-09-03

## 2023-04-29 ENCOUNTER — HEALTH MAINTENANCE LETTER (OUTPATIENT)
Age: 23
End: 2023-04-29